# Patient Record
Sex: FEMALE | Race: WHITE | Employment: PART TIME | URBAN - METROPOLITAN AREA
[De-identification: names, ages, dates, MRNs, and addresses within clinical notes are randomized per-mention and may not be internally consistent; named-entity substitution may affect disease eponyms.]

---

## 2021-04-25 ENCOUNTER — HOSPITAL ENCOUNTER (EMERGENCY)
Age: 21
Discharge: HOME | End: 2021-04-25
Attending: EMERGENCY MEDICINE
Payer: OTHER GOVERNMENT

## 2021-04-25 VITALS
TEMPERATURE: 98.7 F | HEART RATE: 89 BPM | RESPIRATION RATE: 16 BRPM | WEIGHT: 142.64 LBS | DIASTOLIC BLOOD PRESSURE: 74 MMHG | OXYGEN SATURATION: 100 % | SYSTOLIC BLOOD PRESSURE: 106 MMHG

## 2021-04-25 DIAGNOSIS — T78.3XXA ALLERGIC ANGIOEDEMA, INITIAL ENCOUNTER: Primary | ICD-10-CM

## 2021-04-25 PROCEDURE — 96374 THER/PROPH/DIAG INJ IV PUSH: CPT

## 2021-04-25 PROCEDURE — 99283 EMERGENCY DEPT VISIT LOW MDM: CPT

## 2021-04-25 PROCEDURE — 6360000002 HC RX W HCPCS: Performed by: EMERGENCY MEDICINE

## 2021-04-25 PROCEDURE — 2500000003 HC RX 250 WO HCPCS: Performed by: EMERGENCY MEDICINE

## 2021-04-25 PROCEDURE — 96375 TX/PRO/DX INJ NEW DRUG ADDON: CPT

## 2021-04-25 RX ORDER — PREDNISONE 20 MG/1
40 TABLET ORAL DAILY
Qty: 10 TABLET | Refills: 0 | Status: SHIPPED | OUTPATIENT
Start: 2021-04-25 | End: 2021-04-30

## 2021-04-25 RX ORDER — FLUOXETINE HYDROCHLORIDE 40 MG/1
40 CAPSULE ORAL DAILY
COMMUNITY

## 2021-04-25 RX ORDER — DIPHENHYDRAMINE HYDROCHLORIDE 50 MG/ML
50 INJECTION INTRAMUSCULAR; INTRAVENOUS ONCE
Status: COMPLETED | OUTPATIENT
Start: 2021-04-25 | End: 2021-04-25

## 2021-04-25 RX ORDER — METHYLPREDNISOLONE SODIUM SUCCINATE 125 MG/2ML
125 INJECTION, POWDER, LYOPHILIZED, FOR SOLUTION INTRAMUSCULAR; INTRAVENOUS ONCE
Status: COMPLETED | OUTPATIENT
Start: 2021-04-25 | End: 2021-04-25

## 2021-04-25 RX ORDER — OMEPRAZOLE 20 MG/1
20 CAPSULE, DELAYED RELEASE ORAL DAILY
COMMUNITY

## 2021-04-25 RX ADMIN — METHYLPREDNISOLONE SODIUM SUCCINATE 125 MG: 125 INJECTION, POWDER, FOR SOLUTION INTRAMUSCULAR; INTRAVENOUS at 07:52

## 2021-04-25 RX ADMIN — FAMOTIDINE 40 MG: 10 INJECTION, SOLUTION INTRAVENOUS at 07:52

## 2021-04-25 RX ADMIN — DIPHENHYDRAMINE HYDROCHLORIDE 50 MG: 50 INJECTION, SOLUTION INTRAMUSCULAR; INTRAVENOUS at 07:52

## 2021-04-25 NOTE — ED NOTES
D/C instructions given. Pt verbalized understanding. Pt was d/c'd with one prescription.       [address], RN  04/25/21 0900

## 2021-04-25 NOTE — ED PROVIDER NOTES
[facility]  eMERGENCY dEPARTMENT eNCOUnter      Pt Name: Jojo Pratt  MRN: [id number]  [place] 2000  Date of evaluation: 4/25/2021  Provider: Perry Agarwal MD    CHIEF COMPLAINT       Chief Complaint   Patient presents with    Other     pt woke with her lips swollen and chest tightness, states she is unable to get \"a full breath\" feels lightheaded and nauseous         HISTORY OF PRESENT ILLNESS  (Location/Symptom, Timing/Onset, Context/Setting, Quality, Duration, Modifying Factors, Severity.)   Jojo Pratt is a 23 y.o. female who presents to the emergency department complaining of swelling in her upper lip. She states she woke up this morning just prior to coming in. She has swelling in her upper lip. Her chest felt tight like she could not get her breath. She felt like she was wheezing earlier, not now. She denies any rash or itching. No new exposures. No new prescription or over-the-counter medications. Nursing Notes were reviewed and I agree. REVIEW OF SYSTEMS    (2-9 systems for level 4, 10 or more for level 5)     HEENT: No redness or itching the eyes. No nasal congestion. No sore throat. Swelling of the upper lip. Cardiovascular: No chest pain. Pulmonary: Chest feels tight like she cannot get a deep breath. She felt like she was wheezing earlier. No cough. GI: No abdominal pain nausea vomiting or diarrhea. Skin: No rash. Except as noted above the remainder of the review of systems was reviewed and negative. PAST MEDICAL HISTORY         Diagnosis Date    Anxiety     Depression        SURGICAL HISTORY     History reviewed. No pertinent surgical history. CURRENT MEDICATIONS       Previous Medications    FLUOXETINE (PROZAC) 40 MG CAPSULE    Take 40 mg by mouth daily    OMEPRAZOLE (PRILOSEC) 20 MG DELAYED RELEASE CAPSULE    Take 20 mg by mouth daily       ALLERGIES     Patient has no known allergies.     FAMILY HISTORY History reviewed. No pertinent family history. No family status information on file. SOCIAL HISTORY      reports that she has never smoked. She has never used smokeless tobacco. She reports current alcohol use. She reports that she does not use drugs. PHYSICAL EXAM    (up to 7 for level 4, 8 or more for level 5)     ED Triage Vitals [04/25/21 0735]   BP Temp Temp Source Pulse Resp SpO2 Height Weight   106/74 98.7 °F (37.1 °C) Oral 89 16 100 % -- 142 lb 10.2 oz (64.7 kg)       General: Alert female no acute distress. Head: Atraumatic and normocephalic. Eyes: No conjunctival injection. No pallor. Pupils equal round reactive. ENT: TMs are normal.  Nose clear. Oropharynx moist without erythema. There is moderate angioedema of the mid upper lip. Neck: Supple without adenopathy, nontender. Heart: Regular rate and rhythm. No murmurs or gallops noted. Lungs: Breath sounds equal bilaterally and clear. Abdomen: Soft, nondistended, nontender. Skin: Warm and dry, good turgor, no rash. Neuro: Awake, alert, oriented. No focal motor deficits. Normal gait. DIAGNOSTIC RESULTS     RADIOLOGY:   Non-plain film images such as CT, Ultrasound and MRI are read by the radiologist. Plain radiographic images are visualized and preliminarily interpreted by Joe Hernandez MD with the below findings:      Interpretation per the Radiologist below, if available at the time of this note:    No orders to display       LABS:  Labs Reviewed - No data to display    All other labs were within normal range or not returned as of this dictation. EMERGENCY DEPARTMENT COURSE and DIFFERENTIAL DIAGNOSIS/MDM:   Vitals:    Vitals:    04/25/21 0735   BP: 106/74   Pulse: 89   Resp: 16   Temp: 98.7 °F (37.1 °C)   TempSrc: Oral   SpO2: 100%   Weight: 142 lb 10.2 oz (64.7 kg)       This patient woke up with some swelling in her lip and some chest tightness and wheezing.   Additional history from her mother later in her stay

## 2021-04-25 NOTE — ED NOTES
Pt woke this morning with swelling in her lips and feeling like she \"can't get a full breath\". Pt also states that she has had a \"slight cough\" and some nausea. Pt denies any pain or dizziness. Denies any recent exposures. Pt is calm, no difficulty breathing at this time, O2 sats 100 percent.      [address], [address] [place] [address]  04/25/21 1075